# Patient Record
Sex: MALE | Race: WHITE | NOT HISPANIC OR LATINO
[De-identification: names, ages, dates, MRNs, and addresses within clinical notes are randomized per-mention and may not be internally consistent; named-entity substitution may affect disease eponyms.]

---

## 2023-03-13 ENCOUNTER — APPOINTMENT (OUTPATIENT)
Dept: ORTHOPEDIC SURGERY | Facility: CLINIC | Age: 18
End: 2023-03-13
Payer: COMMERCIAL

## 2023-03-13 VITALS — HEIGHT: 66 IN | BODY MASS INDEX: 21.69 KG/M2 | WEIGHT: 135 LBS

## 2023-03-13 DIAGNOSIS — M54.50 LOW BACK PAIN, UNSPECIFIED: ICD-10-CM

## 2023-03-13 DIAGNOSIS — M54.6 PAIN IN THORACIC SPINE: ICD-10-CM

## 2023-03-13 DIAGNOSIS — G89.29 LOW BACK PAIN, UNSPECIFIED: ICD-10-CM

## 2023-03-13 PROBLEM — Z00.00 ENCOUNTER FOR PREVENTIVE HEALTH EXAMINATION: Status: ACTIVE | Noted: 2023-03-13

## 2023-03-13 PROCEDURE — 72114 X-RAY EXAM L-S SPINE BENDING: CPT

## 2023-03-13 PROCEDURE — 72070 X-RAY EXAM THORAC SPINE 2VWS: CPT

## 2023-03-13 PROCEDURE — 99203 OFFICE O/P NEW LOW 30 MIN: CPT

## 2023-03-14 NOTE — PHYSICAL EXAM
[NL (45)] : right lateral flexion 45 degrees [NL (80)] : right lateral rotation 80 degrees [Biceps 2+] : biceps 2+ [Triceps 2+] : triceps 2+ [Brachioradialis 2+] : brachioradialis 2+ [NL (90)] : forward flexion 90 degrees [NL (30)] : right lateral rotation 30 degrees [Bending to left] : bending to left [Bending to right] : bending to right [5___] : right extensor hallicus longus 5[unfilled]/5 [de-identified] : Constitutional:\par - General Appearance:\par Unremarkable\par Body Habitus\par Well Developed\par Well Nourished\par Body Habitus\par No Deformities\par Well Groomed\par Ability To communicate:\par Normal\par Neurologic:\par Global sensation is intact to upper and lower extremities. See examination of Neck and/or Spine\par for exceptions.\par Orientation to Time, Place and Person is: Normal\par Mood And Affect is Normal\par Skin:\par - Head/Face, Right Upper/Lower Extremity, Left Upper/Lower Extremity: Normal\par See Examination of Neck and/or Spine for exceptions\par Cardiovascular:\par Peripheral Cardiovascular System is Normal\par Palpation of Lymph Nodes:\par Normal Palpation of lymph nodes in: Axilla, Cervical, Inguinal\par Abnormal Palpation of lymph nodes in: None  [] : non-antalgic [FreeTextEntry1] : On my interpretations of these images from [facility] in house on 03/13/2023: normal
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
## 2023-03-14 NOTE — ASSESSMENT
[FreeTextEntry1] : 18 y/o male with lumbago. Patient was provided with a referral for thoracolumbar physical therapy to work on stretching, strengthening and range of motion. Patient was provided with a lumbar home exercise program. I discussed with the patient that regular exercise can help to improve his pain. Patient will continue with his current medication regimen with Aleve. Patient will obtain [facility] medical previous records to next visit. I discussed with the patient that if his pain does not improve we can consider getting a lumbar spine MRI at his next visit. Patient will follow up in 6 weeks. \par \par Prior to appointment and during encounter with patient extensive medical records were reviewed including but not limited to, hospital records, outpatient records, imaging results, and lab data.During this appointment the patient was examined, diagnoses were discussed and explained in a face to face manner. In addition extensive time was spent reviewing aforementioned diagnostic studies. Counseling including abnormal image results, differential diagnoses, treatment options, risk and benefits, lifestyle changes, current condition, and current medications was performed. Patient's comments, questions, and concerns were addressed and patient verbalized understanding. Based on this patient's presentation at our office, which is an orthopedic spine surgeon's office, this patient inherently / intrinsically has a risk, however minute, of developing issues such as Cauda equina syndrome, bowel and bladder changes, or progression of motor or neurological deficits such as paralysis which may be permanent.\par \par I, Glenda Ko, attest that this documentation has been prepared under the direction and in the presence of provider Nicholas Ivey MD. \par

## 2023-04-24 ENCOUNTER — APPOINTMENT (OUTPATIENT)
Dept: ORTHOPEDIC SURGERY | Facility: CLINIC | Age: 18
End: 2023-04-24

## 2023-05-22 ENCOUNTER — APPOINTMENT (OUTPATIENT)
Dept: ORTHOPEDIC SURGERY | Facility: CLINIC | Age: 18
End: 2023-05-22

## 2023-08-10 ENCOUNTER — APPOINTMENT (OUTPATIENT)
Dept: FAMILY MEDICINE | Facility: CLINIC | Age: 18
End: 2023-08-10
Payer: COMMERCIAL

## 2023-08-10 ENCOUNTER — NON-APPOINTMENT (OUTPATIENT)
Age: 18
End: 2023-08-10

## 2023-08-10 ENCOUNTER — LABORATORY RESULT (OUTPATIENT)
Age: 18
End: 2023-08-10

## 2023-08-10 VITALS
RESPIRATION RATE: 15 BRPM | HEIGHT: 67 IN | BODY MASS INDEX: 20.88 KG/M2 | TEMPERATURE: 97.6 F | WEIGHT: 133 LBS | DIASTOLIC BLOOD PRESSURE: 70 MMHG | SYSTOLIC BLOOD PRESSURE: 102 MMHG | HEART RATE: 48 BPM | OXYGEN SATURATION: 97 %

## 2023-08-10 DIAGNOSIS — Z82.49 FAMILY HISTORY OF ISCHEMIC HEART DISEASE AND OTHER DISEASES OF THE CIRCULATORY SYSTEM: ICD-10-CM

## 2023-08-10 DIAGNOSIS — F98.8 OTHER SPECIFIED BEHAVIORAL AND EMOTIONAL DISORDERS WITH ONSET USUALLY OCCURRING IN CHILDHOOD AND ADOLESCENCE: ICD-10-CM

## 2023-08-10 DIAGNOSIS — Z91.89 OTHER SPECIFIED PERSONAL RISK FACTORS, NOT ELSEWHERE CLASSIFIED: ICD-10-CM

## 2023-08-10 DIAGNOSIS — Z13.1 ENCOUNTER FOR SCREENING FOR DIABETES MELLITUS: ICD-10-CM

## 2023-08-10 DIAGNOSIS — Z76.89 PERSONS ENCOUNTERING HEALTH SERVICES IN OTHER SPECIFIED CIRCUMSTANCES: ICD-10-CM

## 2023-08-10 DIAGNOSIS — Z83.3 FAMILY HISTORY OF DIABETES MELLITUS: ICD-10-CM

## 2023-08-10 DIAGNOSIS — Z80.1 FAMILY HISTORY OF MALIGNANT NEOPLASM OF TRACHEA, BRONCHUS AND LUNG: ICD-10-CM

## 2023-08-10 DIAGNOSIS — F41.9 ANXIETY DISORDER, UNSPECIFIED: ICD-10-CM

## 2023-08-10 DIAGNOSIS — Z11.4 ENCOUNTER FOR SCREENING FOR HUMAN IMMUNODEFICIENCY VIRUS [HIV]: ICD-10-CM

## 2023-08-10 DIAGNOSIS — Z11.59 ENCOUNTER FOR SCREENING FOR OTHER VIRAL DISEASES: ICD-10-CM

## 2023-08-10 DIAGNOSIS — Z78.9 OTHER SPECIFIED HEALTH STATUS: ICD-10-CM

## 2023-08-10 DIAGNOSIS — M79.602 PAIN IN LEFT ARM: ICD-10-CM

## 2023-08-10 DIAGNOSIS — Z11.3 ENCOUNTER FOR SCREENING FOR INFECTIONS WITH A PREDOMINANTLY SEXUAL MODE OF TRANSMISSION: ICD-10-CM

## 2023-08-10 DIAGNOSIS — Z13.220 ENCOUNTER FOR SCREENING FOR LIPOID DISORDERS: ICD-10-CM

## 2023-08-10 DIAGNOSIS — F32.A ANXIETY DISORDER, UNSPECIFIED: ICD-10-CM

## 2023-08-10 DIAGNOSIS — Z13.0 ENCOUNTER FOR SCREENING FOR DISEASES OF THE BLOOD AND BLOOD-FORMING ORGANS AND CERTAIN DISORDERS INVOLVING THE IMMUNE MECHANISM: ICD-10-CM

## 2023-08-10 PROCEDURE — 99204 OFFICE O/P NEW MOD 45 MIN: CPT | Mod: 25

## 2023-08-10 PROCEDURE — 36415 COLL VENOUS BLD VENIPUNCTURE: CPT

## 2023-08-10 RX ORDER — OMEGA-3/DHA/EPA/FISH OIL 300-1000MG
400 CAPSULE ORAL
Refills: 0 | Status: ACTIVE | COMMUNITY
Start: 2023-08-10

## 2023-08-10 RX ORDER — LISDEXAMFETAMINE DIMESYLATE 30 MG/1
30 CAPSULE ORAL DAILY
Refills: 0 | Status: ACTIVE | COMMUNITY
Start: 2023-08-10

## 2023-08-10 RX ORDER — OMEGA-3/DHA/EPA/FISH OIL 300-1000MG
1000 CAPSULE ORAL
Refills: 0 | Status: ACTIVE | COMMUNITY
Start: 2023-08-10

## 2023-08-11 PROBLEM — Z78.9 ELECTRONIC CIGARETTE USE: Status: ACTIVE | Noted: 2023-08-11
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
## 2023-08-11 NOTE — PHYSICAL EXAM
[No Lymphadenopathy] : no lymphadenopathy [Supple] : supple [Normal] : normal rate, regular rhythm, normal S1 and S2 and no murmur heard [No Edema] : there was no peripheral edema [No Rash] : no rash [No Focal Deficits] : no focal deficits [Normal Affect] : the affect was normal [Normal Mood] : the mood was normal [Normal Insight/Judgement] : insight and judgment were intact [de-identified] : no calf tenderness b/l LE

## 2023-08-11 NOTE — PLAN
[FreeTextEntry1] :  est care  cpe labs check for tick diseases at his request  left arm pain most likely muscle sprain r/o tear  US  hx ADD wants a refill of his vyvanse 30mg po daily prn contract signed advised he needs to give a urine drug screen and will need to do so q6mos he said he could not leave urine today b/c he did not have to urinate given script  f/u for cpe  advised if patient doesnt hear from me 1 week after testing to call office for results , patient agreed w/plan and understood pt and mother agreed w/plan

## 2023-08-15 LAB
A PHAGO GROEL BLD QL NAA+NON-PROBE: NEGATIVE
ALBUMIN SERPL ELPH-MCNC: 4.8 G/DL
ALP BLD-CCNC: 72 U/L
ALT SERPL-CCNC: 12 U/L
ANION GAP SERPL CALC-SCNC: 13 MMOL/L
AST SERPL-CCNC: 17 U/L
B BURGDOR AB SER-IMP: NEGATIVE
B BURGDOR IGG+IGM SER QL: 0.16 INDEX
BILIRUB SERPL-MCNC: 0.5 MG/DL
BUN SERPL-MCNC: 13 MG/DL
CALCIUM SERPL-MCNC: 9.7 MG/DL
CHLORIDE SERPL-SCNC: 102 MMOL/L
CHOLEST SERPL-MCNC: 115 MG/DL
CO2 SERPL-SCNC: 26 MMOL/L
CREAT SERPL-MCNC: 0.96 MG/DL
E CANIS+EWIN GROEL BLD QL NAA+NON-PROBE: NEGATIVE
E CHAFF GROEL BLD QL NAA+NON-PROBE: NEGATIVE
E MURIS EAUCL GROEL BLD QL NAA+NON-PRB: NEGATIVE
EGFR: 117 ML/MIN/1.73M2
GALACTOSE, ALPHA (O215) CONC: <0.1 KUA/L
GALACTOSE-ALPHA-1,3-GALACTOSE IGE: 0
GLUCOSE SERPL-MCNC: 84 MG/DL
HCV AB SER QL: NONREACTIVE
HCV S/CO RATIO: 0.12 S/CO
HDLC SERPL-MCNC: 42 MG/DL
HIV1+2 AB SPEC QL IA.RAPID: NONREACTIVE
LDLC SERPL CALC-MCNC: 53 MG/DL
NONHDLC SERPL-MCNC: 73 MG/DL
POTASSIUM SERPL-SCNC: 4.2 MMOL/L
PROT SERPL-MCNC: 7 G/DL
SODIUM SERPL-SCNC: 141 MMOL/L
T PALLIDUM AB SER QL IA: NEGATIVE
TESTOST FREE SERPL-MCNC: 18.7 PG/ML
TESTOST SERPL-MCNC: 749 NG/DL
TRIGL SERPL-MCNC: 104 MG/DL
TSH SERPL-ACNC: 2 UIU/ML

## 2023-08-17 LAB
ANAPLASMA PHAGOCYTOPHILIA IGG ANTIBODIES: NEGATIVE
ANAPLASMA PHAGOCYTOPHILIA IGM ANTIBODIES: NEGATIVE
EHRLICIA CHAFFEENIS IGG ANTIBODIES: NEGATIVE
EHRLICIA CHAFFEENIS IGM ANTIBODIES: NEGATIVE
ERHLICIA RESULT COMMENT: NORMAL

## 2023-08-22 LAB
BABESIA ANTIBODIES, IGG: NORMAL
BABESIA ANTIBODIES, IGM: NORMAL
BABESIA RESULT COMMENT: NORMAL

## 2023-12-22 ENCOUNTER — APPOINTMENT (OUTPATIENT)
Dept: ORTHOPEDIC SURGERY | Facility: CLINIC | Age: 18
End: 2023-12-22
Payer: COMMERCIAL

## 2023-12-22 VITALS — WEIGHT: 145 LBS | HEIGHT: 67 IN | BODY MASS INDEX: 22.76 KG/M2

## 2023-12-22 DIAGNOSIS — M75.22 BICIPITAL TENDINITIS, LEFT SHOULDER: ICD-10-CM

## 2023-12-22 DIAGNOSIS — M25.512 PAIN IN LEFT SHOULDER: ICD-10-CM

## 2023-12-22 PROCEDURE — 73030 X-RAY EXAM OF SHOULDER: CPT | Mod: LT

## 2023-12-22 PROCEDURE — 99214 OFFICE O/P EST MOD 30 MIN: CPT | Mod: 25,57

## 2023-12-22 PROCEDURE — 20611 DRAIN/INJ JOINT/BURSA W/US: CPT | Mod: LT

## 2023-12-22 NOTE — PROCEDURE
[FreeTextEntry3] : Large Joint Injection was performed because of pain and inflammation.  Anesthesia: ethyl chloride sprayed topically..  Depomedrol: An injection of Depomedrol 40 mg , 1 cc.  Lidocaine: 3 cc.  Medication was injected in the left bicipital grove. Patient has tried OTC's including aspirin, Ibuprofen, Aleve etc or prescription NSAIDS, and/or exercises at home and/ or physical therapy without satisfactory response and Patient has decreased mobility in the joint. After verbal consent using sterile preparation and technique. The risks, benefits, and alternatives to cortisone injection were explained in full to the patient. Risks outlined include but are not limited to infection, sepsis, bleeding, scarring, skin discoloration, temporary increase in pain, syncopal episode, failure to resolve symptoms, allergic reaction, symptom recurrence, and elevation of blood sugar in diabetics. Patient understood the risks. All questions were answered. After discussion of options, patient requested an injection. Oral informed consent was obtained and sterile prep was done of the injection site. Sterile technique was utilized for the procedure including the preparation of the solutions used for the injection. Patient tolerated the procedure well. Advised to ice the injection site this evening. Prep with alcohol locally to site. Sterile technique used. Patient tolerated procedure well. Post Procedure Instructions: Patient was advised to call if redness, pain, or fever occur and apply ice for 15 min. out of every hour for the next 12-24 hours as tolerated. patient was advised to rest the joint(s) for 7 days.  Ultrasound Guidance was used for the following reasons: prior failure or difficult injection.  Ultrasound guided injection was performed of the shoulder, visualization of the needle and placement of injection was performed without complication.

## 2023-12-22 NOTE — DISCUSSION/SUMMARY
[de-identified] : LUE: TTP LHBT, pain with bear hug and belly press +Speeds referred to biceps, + Obriens Pain and 90/90 ER   This is a 19 yo male presenting to the office with his mom c/o ongoing left bicep pain since February. Prior Shoulder ultrasound was unremarkable  Xrays are nondiagnostic Exam consistent with long head of biceps tendonitis  Pt was treated with an ultrasound guided biceps injection for diagnostic and therapeutic purposes  If no relief from injection consider MRI arthrogram as there could be an element of posterior labral instability/tear contributing  Patient goes to school in [place] therefore I recommended he see Jose Antonio Conner who is a shoulder specialist in [place] For further treatment during the in school session.   (1) We discussed a comprehensive treatment plans that included a prescription management plan involving the use of prescription strength medications to include Ibuprofen 600-800 mg TID, versus 500-650 mg Tylenol. We also discussed prescribing topical diclofenac (Voltaren gel) as well as once daily Meloxicam 15 mg. (2) The patient has More Than One chronic injuries/illnesses as outlined, discussed, and documented by ICD 10 codes listed, as well as the HPI and Plan section. There is a moderate risk of morbidity with further treatment, especially from use of prescription strength medications and possible side effects of these medications which include upset stomach and cardiac/renal issues with long term use were discussed. (3) I secommended that the patient follow-up with their medical physician to discuss any significant specific potential issues with long term use such as interactions with current medications or with exacerbation of underlying medical morbidities.   Attestation: I, Sherry Richards , attest that this documentation has been prepared under the direction and in the presence of Provider Eder Chacon MD. The documentation recorded by the scribe, in my presence, accurately reflects the service I personally performed, and the decisions made by me with my edits as appropriate. Eder Chacon MD

## 2023-12-22 NOTE — PHYSICAL EXAM
[Left] : left shoulder [There are no fractures, subluxations or dislocations. No significant abnormalities are seen] : There are no fractures, subluxations or dislocations. No significant abnormalities are seen [Type 2 acromion] : Type 2 acromion [de-identified] : Constitutional: The general appearance of the patient is well developed, well nourished, no deformities and well groomed. Normal  Gait: Gait and function is as follows: normal gait.  Skin: Head and neck visualized skin is normal. Left upper extremity visualized skin is normal. Right upper extremity visualized skin is normal. Thoracic Skin of the thoracic spine shows visualized skin is normal.  Cardiovascular: palpable radial pulse bilaterally, good capillary refill in digits of the bilateral upper extremities and no temperature or color changes in the bilateral upper extremities.  Lymphatic: Normal Palpation of lymph nodes in the cervical.  Neurologic: fine motor control in the bilateral upper extremities is intact. Deep Tendon Reflexes in Upper and Lower Extremities Negative Alvarez's in the bilateral upper extremities. The patient is oriented to time, place and person. Sensation to light touch intact in the bilateral upper extremities. Mood and Affect is normal.  Right Shoulder: Inspection of the shoulder/upper arm is as follows: There is a full, pain-free, stable range of motion of the shoulder with normal strength and no tenderness to palpation.  Left Shoulder: Inspection of the shoulder/upper arm is as follows: no scapula winging, no biceps deformity and no AC joint deformity. Palpation of the shoulder/upper arm is as follows: There is tenderness at the proximal biceps tendon. Range of motion of the shoulder is as follows: Pain with internal rotation, external rotation, abduction and forward flexion. Strength of the shoulder is as follows: Supraspinatus 4/5. External Rotation 4/5. Internal Rotation 4/5. Deltoid 5/5 Ligament Stability and Special Tests of the shoulder is as follows: Neer test is positive. Stoll' test is positive. Speed's test is positive.  Neck:  Inspection / Palpation of the cervical spine is as follows: mild paracervical tenderness. Range of motion of the cervical spine is as follows: moderately decreased range of motion of the cervical spine. Stability testing for the cervical spine is as follows Stable range of motion.  Back, including spine: Inspection / Palpation of the thoracic/lumbar spine is as follows: There is a full, pain free, stable range of motion of the thoracic spine with a normal tone and not tenderness to palpation..

## 2023-12-22 NOTE — HISTORY OF PRESENT ILLNESS
[de-identified] :  17 yo male presenting to the office with his mom c/o ongoing left bicep pain since February. Pain has become more severe since August. No prior dislocations. Pain is described as constant, severe in quality. Currently pain is 6/10 and non-radiating. Patient reports pain has been getting progressively worse. Pain is worse at night. Overall pain does improve with rest and ice. Patient denies any numbness or tingling. Pt saw his PCP who ordered an MRI which was denied. Pt had an ultrasound done.  ~Patient reports no traumatic injury, does notice some anterior shoulder pain with supination and dips and bench press.

## 2025-04-09 ENCOUNTER — NON-APPOINTMENT (OUTPATIENT)
Age: 20
End: 2025-04-09

## 2025-04-10 ENCOUNTER — NON-APPOINTMENT (OUTPATIENT)
Age: 20
End: 2025-04-10

## 2025-04-10 ENCOUNTER — APPOINTMENT (OUTPATIENT)
Dept: FAMILY MEDICINE | Facility: CLINIC | Age: 20
End: 2025-04-10
Payer: COMMERCIAL

## 2025-04-10 VITALS
TEMPERATURE: 98.5 F | BODY MASS INDEX: 22.6 KG/M2 | RESPIRATION RATE: 14 BRPM | HEIGHT: 67 IN | OXYGEN SATURATION: 99 % | HEART RATE: 71 BPM | SYSTOLIC BLOOD PRESSURE: 118 MMHG | DIASTOLIC BLOOD PRESSURE: 72 MMHG | WEIGHT: 144 LBS

## 2025-04-10 VITALS — SYSTOLIC BLOOD PRESSURE: 122 MMHG | DIASTOLIC BLOOD PRESSURE: 68 MMHG

## 2025-04-10 DIAGNOSIS — R68.82 DECREASED LIBIDO: ICD-10-CM

## 2025-04-10 DIAGNOSIS — R53.83 OTHER FATIGUE: ICD-10-CM

## 2025-04-10 PROCEDURE — 99214 OFFICE O/P EST MOD 30 MIN: CPT

## 2025-04-10 PROCEDURE — 93000 ELECTROCARDIOGRAM COMPLETE: CPT

## 2025-04-11 ENCOUNTER — APPOINTMENT (OUTPATIENT)
Dept: CARDIOLOGY | Facility: CLINIC | Age: 20
End: 2025-04-11

## 2025-04-11 ENCOUNTER — NON-APPOINTMENT (OUTPATIENT)
Age: 20
End: 2025-04-11

## 2025-04-11 VITALS
OXYGEN SATURATION: 98 % | SYSTOLIC BLOOD PRESSURE: 132 MMHG | DIASTOLIC BLOOD PRESSURE: 66 MMHG | HEIGHT: 67 IN | HEART RATE: 85 BPM

## 2025-04-11 DIAGNOSIS — R00.2 PALPITATIONS: ICD-10-CM

## 2025-04-11 PROCEDURE — 93000 ELECTROCARDIOGRAM COMPLETE: CPT | Mod: 59

## 2025-04-11 PROCEDURE — 99204 OFFICE O/P NEW MOD 45 MIN: CPT | Mod: 25

## 2025-04-11 PROCEDURE — 93242 EXT ECG>48HR<7D RECORDING: CPT

## 2025-04-24 ENCOUNTER — APPOINTMENT (OUTPATIENT)
Dept: FAMILY MEDICINE | Facility: CLINIC | Age: 20
End: 2025-04-24
Payer: COMMERCIAL

## 2025-04-24 VITALS
DIASTOLIC BLOOD PRESSURE: 70 MMHG | HEART RATE: 86 BPM | RESPIRATION RATE: 14 BRPM | BODY MASS INDEX: 22.6 KG/M2 | SYSTOLIC BLOOD PRESSURE: 120 MMHG | OXYGEN SATURATION: 98 % | HEIGHT: 67 IN | WEIGHT: 144 LBS

## 2025-04-24 DIAGNOSIS — S01.00XA UNSPECIFIED OPEN WOUND OF SCALP, INITIAL ENCOUNTER: ICD-10-CM

## 2025-04-24 PROCEDURE — 99214 OFFICE O/P EST MOD 30 MIN: CPT

## 2025-04-24 PROCEDURE — G2211 COMPLEX E/M VISIT ADD ON: CPT | Mod: NC

## 2025-04-24 PROCEDURE — 93244 EXT ECG>48HR<7D REV&INTERPJ: CPT

## 2025-04-30 ENCOUNTER — APPOINTMENT (OUTPATIENT)
Dept: CARDIOLOGY | Facility: CLINIC | Age: 20
End: 2025-04-30